# Patient Record
Sex: MALE | Race: WHITE | NOT HISPANIC OR LATINO | Employment: FULL TIME | URBAN - METROPOLITAN AREA
[De-identification: names, ages, dates, MRNs, and addresses within clinical notes are randomized per-mention and may not be internally consistent; named-entity substitution may affect disease eponyms.]

---

## 2017-03-03 ENCOUNTER — TELEPHONE (OUTPATIENT)
Dept: NEUROLOGY | Facility: CLINIC | Age: 42
End: 2017-03-03

## 2017-03-03 NOTE — TELEPHONE ENCOUNTER
PA Initiation    Medication: Aubagio 14mg  Insurance Company: [employer] - Phone [phone] Fax [phone]  Pharmacy Filling the Rx: [facility] - [place], [place] - [address]  Filling Pharmacy Phone:    Filling Pharmacy Fax:    Start Date: 3/3/2017

## 2017-03-03 NOTE — TELEPHONE ENCOUNTER
Prior Authorization Specialty Medication Request    Medication/Dose: Aubagio 14mg  Diagnosis and ICD: Relapsing Remitting Multiple Sclerosis, G35  New/Renewal/Insurance Change PA: Renewal    Important Lab Values: n/a    Previously Tried and Failed Therapies: Extavia and Betaseron    Rationale: Continuation of current disease modifying therapy for demyelinating disease. Patient remains clinically stable on, please approve.    Would you like to include any research articles? n/a   If yes please include the hyperlink(s) below or fax @ [phone].    (Include Name and MRN)    If you received a fax notification from an outside Pharmacy;  Pharmacy Name:[facility]  Pharmacy #:n/a  Pharmacy Fax:n/a

## 2017-03-06 NOTE — TELEPHONE ENCOUNTER
Prior Authorization Approval    Authorization Effective Date: 3/3/2017  Authorization Expiration Date: 3/3/2019  Medication: Aubagio 14mg APPROVED  Approved Dose/Quantity: 28 per 28 days  Reference #: [id number] ([email or web address])  Insurance Company: [employer] - Phone [phone] Fax [phone]  Expected CoPay: N/A     CoPay Card Available:      Foundation Assistance Needed:    Which Pharmacy is filling the prescription (Not needed for infusion/clinic administered): [facility] - [place], [place] - [address] [place] [address]  Pharmacy Notified: No  Patient Notified: Yes

## 2017-05-09 ENCOUNTER — OFFICE VISIT (OUTPATIENT)
Dept: NEUROLOGY | Facility: CLINIC | Age: 42
End: 2017-05-09
Attending: PSYCHIATRY & NEUROLOGY
Payer: COMMERCIAL

## 2017-05-09 VITALS — HEIGHT: 69 IN | SYSTOLIC BLOOD PRESSURE: 136 MMHG | HEART RATE: 77 BPM | DIASTOLIC BLOOD PRESSURE: 80 MMHG

## 2017-05-09 DIAGNOSIS — G35 MULTIPLE SCLEROSIS (H): Primary | ICD-10-CM

## 2017-05-09 LAB
ALBUMIN SERPL-MCNC: 3.8 G/DL (ref 3.4–5)
ALP SERPL-CCNC: 61 U/L (ref 40–150)
ALT SERPL W P-5'-P-CCNC: 25 U/L (ref 0–70)
AST SERPL W P-5'-P-CCNC: 25 U/L (ref 0–45)
BASOPHILS # BLD AUTO: 0.1 10E9/L (ref 0–0.2)
BASOPHILS NFR BLD AUTO: 1.5 %
BILIRUB DIRECT SERPL-MCNC: 0.2 MG/DL (ref 0–0.2)
BILIRUB SERPL-MCNC: 0.6 MG/DL (ref 0.2–1.3)
DIFFERENTIAL METHOD BLD: ABNORMAL
EOSINOPHIL # BLD AUTO: 0.3 10E9/L (ref 0–0.7)
EOSINOPHIL NFR BLD AUTO: 9.9 %
ERYTHROCYTE [DISTWIDTH] IN BLOOD BY AUTOMATED COUNT: 13.1 % (ref 10–15)
HCT VFR BLD AUTO: 44.5 % (ref 40–53)
HGB BLD-MCNC: 14.8 G/DL (ref 13.3–17.7)
IMM GRANULOCYTES # BLD: 0 10E9/L (ref 0–0.4)
IMM GRANULOCYTES NFR BLD: 0 %
LYMPHOCYTES # BLD AUTO: 1.3 10E9/L (ref 0.8–5.3)
LYMPHOCYTES NFR BLD AUTO: 38 %
MCH RBC QN AUTO: 28.7 PG (ref 26.5–33)
MCHC RBC AUTO-ENTMCNC: 33.3 G/DL (ref 31.5–36.5)
MCV RBC AUTO: 86 FL (ref 78–100)
MONOCYTES # BLD AUTO: 0.4 10E9/L (ref 0–1.3)
MONOCYTES NFR BLD AUTO: 12 %
NEUTROPHILS # BLD AUTO: 1.3 10E9/L (ref 1.6–8.3)
NEUTROPHILS NFR BLD AUTO: 38.6 %
NRBC # BLD AUTO: 0 10*3/UL
NRBC BLD AUTO-RTO: 0 /100
PLATELET # BLD AUTO: 148 10E9/L (ref 150–450)
PROT SERPL-MCNC: 7 G/DL (ref 6.8–8.8)
RBC # BLD AUTO: 5.16 10E12/L (ref 4.4–5.9)
WBC # BLD AUTO: 3.3 10E9/L (ref 4–11)

## 2017-05-09 PROCEDURE — 85025 COMPLETE CBC W/AUTO DIFF WBC: CPT | Performed by: PSYCHIATRY & NEUROLOGY

## 2017-05-09 PROCEDURE — 40000809 ZZH STATISTIC NO DOCUMENTATION TO SUPPORT CHARGE

## 2017-05-09 PROCEDURE — 80076 HEPATIC FUNCTION PANEL: CPT | Performed by: PSYCHIATRY & NEUROLOGY

## 2017-05-09 PROCEDURE — 36415 COLL VENOUS BLD VENIPUNCTURE: CPT | Performed by: PSYCHIATRY & NEUROLOGY

## 2017-05-09 ASSESSMENT — PAIN SCALES - GENERAL: PAINLEVEL: NO PAIN (0)

## 2017-05-09 NOTE — MR AVS SNAPSHOT
After Visit Summary   5/9/2017    Jamal Perez    MRN: [id number]           Patient Information     Date Of Birth          1975        Visit Information        Provider Department      5/9/2017 8:00 AM Francisco Brian,  [facility] Multiple Sclerosis        Today's Diagnoses     Multiple sclerosis (H)    -  1       Follow-ups after your visit        Follow-up notes from your care team     Return in about 6 months (around 11/9/2017).      Your next 10 appointments already scheduled     May 09, 2017  8:45 AM CDT   LAB with  LAB   [facility] ([facility])    [address] [place] [address] [phone]           Patient must bring picture ID.  Patient should be prepared to give a urine specimen  Please do not eat 10-12 hours before your appointment if you are coming in fasting for labs on lipids, cholesterol, or glucose (sugar).  Pregnant women should follow their Care Team instructions. Water with medications is okay. Do not drink coffee or other fluids.   If you have concerns about taking  your medications, please ask at office or if scheduling via [employer], send a message by clicking on Secure Messaging, Message Your Care Team.            Nov 06, 2017  8:00 AM CST   (Arrive by 7:45 AM)   Return Multiple Sclerosis with GERI Fletcher CNP   [facility] Multiple Sclerosis ([facility])    [address] [place] [address] [phone]              Who to contact     If you have questions or need follow up information about today's clinic visit or your schedule please contact [facility] MULTIPLE SCLEROSIS directly at [phone].  Normal or non-critical lab and imaging results will be communicated to you by MyChart, letter or phone within 4 business days after the clinic has received the results. If you do not hear from us within 7 days, please contact the clinic through [employer] or  "phone. If you have a critical or abnormal lab result, we will notify you by phone as soon as possible.  Submit refill requests through [employer] or call your pharmacy and they will forward the refill request to us. Please allow 3 business days for your refill to be completed.          Additional Information About Your Visit        Media Chaperonehart Information     [employer] gives you secure access to your electronic health record. If you see a primary care provider, you can also send messages to your care team and make appointments. If you have questions, please call your primary care clinic.  If you do not have a primary care provider, please call [phone] and they will assist you.        Care EveryWhere ID     This is your Care EveryWhere ID. This could be used by other organizations to access your [place] medical records  [id number]        Your Vitals Were     Pulse Height                77 1.753 m (5' 9\")           Blood Pressure from Last 3 Encounters:   05/09/17 136/80   11/15/16 136/83   05/25/16 124/77    Weight from Last 3 Encounters:   11/15/16 68 kg (150 lb)   05/25/16 68 kg (150 lb)   07/27/15 64.4 kg (142 lb)              We Performed the Following     CBC with platelets differential     Hepatic panel        Primary Care Provider Office Phone # Fax #    [facility] [phone]       [facility] [address]  [place] [address]        Thank you!     Thank you for choosing [facility] MULTIPLE SCLEROSIS  for your care. Our goal is always to provide you with excellent care. Hearing back from our patients is one way we can continue to improve our services. Please take a few minutes to complete the written survey that you may receive in the mail after your visit with us. Thank you!             Your Updated Medication List - Protect others around you: Learn how to safely use, store and throw away your medicines at www.disposemymeds.org.          This list is accurate as of: " 5/9/17  8:25 AM.  Always use your most recent med list.                   Brand Name Dispense Instructions for use    CLARITIN 10 MG tablet   Generic drug:  loratadine      Take 1 tablet by mouth. Every morning as needed       ibuprofen 200 MG tablet    ADVIL/MOTRIN     Take 1 tablet by mouth. Every 6-8 hours as needed       Multi-vitamin Tabs tablet   Generic drug:  multivitamin, therapeutic with minerals      Take 1 tablet by mouth daily.       teriflunomide 14 MG tablet    AUBAGIO    30 tablet    Take 1 tablet (14 mg) by mouth daily       TYLENOL 500 MG tablet   Generic drug:  acetaminophen      Take 1 tablet by mouth. every 4-6 hours as needed       vitamin D 1000 UNITS capsule      Take 1 capsule by mouth daily.

## 2017-05-09 NOTE — LETTER
5/9/2017     RE: Jamal Perez  [address]  [place] [facility] [address]     Dear Colleague,    Thank you for referring your patient, Jamal Perez, to the [facility] MULTIPLE SCLEROSIS at [facility]. Please see a copy of my visit note below.    SUBJECTIVE:  Followup on Jamal Perez, whom I see for relapsing-remitting multiple sclerosis.  He is tolerating the Aubagio well, not missing any doses.  He has been on it now for about 2 years.  He had a followup MRI done on 05/05, which I reviewed and is stable.  There are no new lesions, no enhancing activity, no change from the previous study done a year earlier.  He has been on the Aubagio now for 2 years, and he has remained stable.  He still gets intermittent tingling of his hands if he gets stressed or overheated.  He has no other issues or concerns.  He denies any bowel or bladder issues.  He denies any issues with fatigue.      MEDICATIONS:  Reviewed and up-to-date in Epic.      REVIEW OF SYSTEMS:  As per History of Present Illness.      PHYSICAL EXAMINATION:   VITAL SIGNS:  Blood pressure 136/80, pulse 77.   GENERAL:  Pleasant, well-developed, well-nourished male in no apparent distress.   CRANIAL NERVES:  II-XII are intact.   MOTOR:  Strength is 5/5 in all extremities.  Normal bulk and tone.  No pronator drift.   GAIT:  Unremarkable.      IMPRESSION:  Relapsing-remitting multiple sclerosis, responding favorably to Aubagio.      PLAN:     1.  CBC and hepatic profile.     2.  Followup with [facility] in 6 months.         CORTNEY WASHINGTON,

## 2017-05-10 NOTE — PROGRESS NOTES
SUBJECTIVE:  Followup on Jamal Perez, whom I see for relapsing-remitting multiple sclerosis.  He is tolerating the Aubagio well, not missing any doses.  He has been on it now for about 2 years.  He had a followup MRI done on , which I reviewed and is stable.  There are no new lesions, no enhancing activity, no change from the previous study done a year earlier.  He has been on the Aubagio now for 2 years, and he has remained stable.  He still gets intermittent tingling of his hands if he gets stressed or overheated.  He has no other issues or concerns.  He denies any bowel or bladder issues.  He denies any issues with fatigue.      MEDICATIONS:  Reviewed and up-to-date in Epic.      REVIEW OF SYSTEMS:  As per History of Present Illness.      PHYSICAL EXAMINATION:   VITAL SIGNS:  Blood pressure 136/80, pulse 77.   GENERAL:  Pleasant, well-developed, well-nourished male in no apparent distress.   CRANIAL NERVES:  II-XII are intact.   MOTOR:  Strength is 5/5 in all extremities.  Normal bulk and tone.  No pronator drift.   GAIT:  Unremarkable.      IMPRESSION:  Relapsing-remitting multiple sclerosis, responding favorably to Aubagio.      PLAN:     1.  CBC and hepatic profile.     2.  Followup with [place] in 6 months.         CORTNEY WASHINGTON DO             D: 2017 08:24   T: 05/10/2017 05:06   MT: farrah      Name:     JAMAL PEREZ   MRN:      [id number]        Account:      [id number]   :      1975           Service Date: 2017      Document: [id number]

## 2017-06-28 DIAGNOSIS — G35 MULTIPLE SCLEROSIS (H): ICD-10-CM

## 2017-06-28 RX ORDER — TERIFLUNOMIDE 14 MG/1
14 TABLET, FILM COATED ORAL DAILY
Qty: 30 TABLET | Refills: 11 | Status: SHIPPED | OUTPATIENT
Start: 2017-06-28 | End: 2018-01-18

## 2017-06-28 NOTE — TELEPHONE ENCOUNTER
Received refill request for Aubagio from [facility]; Patient was last seen in May by Dr Brian and has follow up appointment in November with Kim Bunch; Refilled for 1 year per [place] refill protocol.    Allyson Stevens, MS RN Care Coordinator

## 2017-11-06 ENCOUNTER — OFFICE VISIT (OUTPATIENT)
Dept: NEUROLOGY | Facility: CLINIC | Age: 42
End: 2017-11-06
Attending: NURSE PRACTITIONER
Payer: COMMERCIAL

## 2017-11-06 VITALS
SYSTOLIC BLOOD PRESSURE: 130 MMHG | DIASTOLIC BLOOD PRESSURE: 80 MMHG | HEIGHT: 69 IN | HEART RATE: 84 BPM | BODY MASS INDEX: 22.36 KG/M2 | WEIGHT: 151 LBS

## 2017-11-06 DIAGNOSIS — G35 MULTIPLE SCLEROSIS (H): ICD-10-CM

## 2017-11-06 DIAGNOSIS — E55.9 VITAMIN D DEFICIENCY: Primary | ICD-10-CM

## 2017-11-06 DIAGNOSIS — E55.9 VITAMIN D DEFICIENCY: ICD-10-CM

## 2017-11-06 LAB
ALBUMIN SERPL-MCNC: 4.3 G/DL (ref 3.4–5)
ALP SERPL-CCNC: 68 U/L (ref 40–150)
ALT SERPL W P-5'-P-CCNC: 32 U/L (ref 0–70)
AST SERPL W P-5'-P-CCNC: 27 U/L (ref 0–45)
BASOPHILS # BLD AUTO: 0.1 10E9/L (ref 0–0.2)
BASOPHILS NFR BLD AUTO: 0.9 %
BILIRUB DIRECT SERPL-MCNC: 0.2 MG/DL (ref 0–0.2)
BILIRUB SERPL-MCNC: 0.9 MG/DL (ref 0.2–1.3)
DEPRECATED CALCIDIOL+CALCIFEROL SERPL-MC: 40 UG/L (ref 20–75)
DIFFERENTIAL METHOD BLD: NORMAL
EOSINOPHIL # BLD AUTO: 0.3 10E9/L (ref 0–0.7)
EOSINOPHIL NFR BLD AUTO: 3.9 %
ERYTHROCYTE [DISTWIDTH] IN BLOOD BY AUTOMATED COUNT: 13.3 % (ref 10–15)
HCT VFR BLD AUTO: 46.6 % (ref 40–53)
HGB BLD-MCNC: 15.3 G/DL (ref 13.3–17.7)
IMM GRANULOCYTES # BLD: 0 10E9/L (ref 0–0.4)
IMM GRANULOCYTES NFR BLD: 0.3 %
LYMPHOCYTES # BLD AUTO: 1.3 10E9/L (ref 0.8–5.3)
LYMPHOCYTES NFR BLD AUTO: 19.4 %
MCH RBC QN AUTO: 28.5 PG (ref 26.5–33)
MCHC RBC AUTO-ENTMCNC: 32.8 G/DL (ref 31.5–36.5)
MCV RBC AUTO: 87 FL (ref 78–100)
MONOCYTES # BLD AUTO: 0.7 10E9/L (ref 0–1.3)
MONOCYTES NFR BLD AUTO: 11 %
NEUTROPHILS # BLD AUTO: 4.2 10E9/L (ref 1.6–8.3)
NEUTROPHILS NFR BLD AUTO: 64.5 %
NRBC # BLD AUTO: 0 10*3/UL
NRBC BLD AUTO-RTO: 0 /100
PLATELET # BLD AUTO: 166 10E9/L (ref 150–450)
PROT SERPL-MCNC: 7.6 G/DL (ref 6.8–8.8)
RBC # BLD AUTO: 5.37 10E12/L (ref 4.4–5.9)
WBC # BLD AUTO: 6.4 10E9/L (ref 4–11)

## 2017-11-06 PROCEDURE — 36415 COLL VENOUS BLD VENIPUNCTURE: CPT | Performed by: NURSE PRACTITIONER

## 2017-11-06 PROCEDURE — 85025 COMPLETE CBC W/AUTO DIFF WBC: CPT | Performed by: NURSE PRACTITIONER

## 2017-11-06 PROCEDURE — 82306 VITAMIN D 25 HYDROXY: CPT | Performed by: NURSE PRACTITIONER

## 2017-11-06 PROCEDURE — 99212 OFFICE O/P EST SF 10 MIN: CPT | Mod: ZF

## 2017-11-06 PROCEDURE — 80076 HEPATIC FUNCTION PANEL: CPT | Performed by: NURSE PRACTITIONER

## 2017-11-06 ASSESSMENT — PAIN SCALES - GENERAL: PAINLEVEL: NO PAIN (0)

## 2017-11-06 NOTE — MR AVS SNAPSHOT
After Visit Summary   11/6/2017    Jamal Perez    MRN: [id number]           Patient Information     Date Of Birth          1975        Visit Information        Provider Department      11/6/2017 8:00 AM Kim Bunch APRN CNP [facility] Multiple Sclerosis        Today's Diagnoses     Vitamin D deficiency    -  1    Multiple sclerosis (H)          Care Instructions    1. Labs today.    2. Please contact us with questions or concerns.           Follow-ups after your visit        Future tests that were ordered for you today     Open Future Orders        Priority Expected Expires Ordered    Vitamin D Deficiency Screening Routine  11/6/2018 11/6/2017    CBC with platelets differential Routine  11/6/2018 11/6/2017    Hepatic panel Routine  11/6/2018 11/6/2017            Who to contact     If you have questions or need follow up information about today's clinic visit or your schedule please contact [facility] MULTIPLE SCLEROSIS directly at [phone].  Normal or non-critical lab and imaging results will be communicated to you by 8aweekhart, letter or phone within 4 business days after the clinic has received the results. If you do not hear from us within 7 days, please contact the clinic through 8a[employer]hart or phone. If you have a critical or abnormal lab result, we will notify you by phone as soon as possible.  Submit refill requests through [employer] or call your pharmacy and they will forward the refill request to us. Please allow 3 business days for your refill to be completed.          Additional Information About Your Visit        MyChart Information     [employer] gives you secure access to your electronic health record. If you see a primary care provider, you can also send messages to your care team and make appointments. If you have questions, please call your primary care clinic.  If you do not have a primary care provider, please call [phone] and they will assist you.        Care EveryWhere  "ID     This is your Care EveryWhere ID. This could be used by other organizations to access your [place] medical records  [id number]        Your Vitals Were     Pulse Height BMI (Body Mass Index)             84 1.753 m (5' 9\") 22.3 kg/m2          Blood Pressure from Last 3 Encounters:   11/06/17 130/80   05/09/17 136/80   11/15/16 136/83    Weight from Last 3 Encounters:   11/06/17 68.5 kg (151 lb)   11/15/16 68 kg (150 lb)   05/25/16 68 kg (150 lb)               Primary Care Provider Office Phone # Fax #    [facility] [phone] [address] [address]        Equal Access to Services     SKYLAR ROSALES : Hadii estephanie headley hadasho Soomaali, waaxda luqadaha, qaybta kaalmada adeegyada, agustin ponce . So [facility] [phone].    ATENCIÓN: Si habla español, tiene a hogan disposición servicios gratuitos de asistencia lingüística. Llame al [phone].    We comply with applicable federal civil rights laws and [place] laws. We do not discriminate on the basis of race, color, national origin, age, disability, sex, sexual orientation, or gender identity.            Thank you!     Thank you for choosing [facility] MULTIPLE SCLEROSIS  for your care. Our goal is always to provide you with excellent care. Hearing back from our patients is one way we can continue to improve our services. Please take a few minutes to complete the written survey that you may receive in the mail after your visit with us. Thank you!             Your Updated Medication List - Protect others around you: Learn how to safely use, store and throw away your medicines at www.disposemymeds.org.          This list is accurate as of: 11/6/17  8:37 AM.  Always use your most recent med list.                   Brand Name Dispense Instructions for use Diagnosis    CLARITIN 10 MG tablet   Generic drug:  loratadine      Take 1 tablet by mouth. Every morning as needed        ibuprofen 200 MG tablet    " ADVIL/MOTRIN     Take 1 tablet by mouth. Every 6-8 hours as needed        Multi-vitamin Tabs tablet   Generic drug:  multivitamin, therapeutic with minerals      Take 1 tablet by mouth daily.        teriflunomide 14 MG tablet    AUBAGIO    30 tablet    Take 1 tablet (14 mg) by mouth daily    Multiple sclerosis (H)       TYLENOL 500 MG tablet   Generic drug:  acetaminophen      Take 1 tablet by mouth. every 4-6 hours as needed        vitamin D 1000 UNITS capsule      Take 1 capsule by mouth daily.

## 2017-11-06 NOTE — LETTER
11/6/2017       RE: Jamal Perez  [address]  [place] [address]     Dear Colleague,    Thank you for referring your patient, Jamal Perez, to the [facility] MULTIPLE SCLEROSIS at [facility]. Please see a copy of my visit note below.      [facility] OUTPATIENT MULTIPLE SCLEROSIS CLINIC VISIT NOTE    REASON FOR VISIT:  Jamal is a 42-year-old male who returns to the clinic today for regularly scheduled followup of his diagnosis of relapsing remitting multiple sclerosis.  He was most recently seen by Dr. Brian on 05/09/2017.       HISTORY OF PRESENT ILLNESS:     DATE OF ONSET:   1996   INITIAL SYMPTOMS:   Numbness in his left side which gradually spread to his right.   DATE OF DIAGNOSIS: In 1998 after follow-up MRI which showed new lesions.     INITIAL CLINICAL COURSE:   Relapsing remitting.   CURRENT CLINICAL COURSE:  Relapsing remitting.   PAST DISEASE MODIFYING THERAPY:  Betaseron (1998- 2015) switched to Aubagio in 2015 due to injection fatigue.   CURRENT DISEASE MODIFYING THERAPY:   Aubagio.      INTERVAL HISTORY SINCE LAST VISIT:  Overall, Jamal is doing well.  No recent hospitalization or illness.  He denies any new changes in his vision, balance, strength or sensation suggestive of a new relapse of multiple sclerosis since he was last seen here.  He is currently on Aubagio, tolerating the medication well.  No major side effects from the medication.  During his last visit he had lab work done.  His hepatic panel was stable.  Noted a low platelet count of 148 and a low absolute neutrophil count of 1.3.  His upper and lower extremities are working well for him. Denies n/t or weakness.  Reports his mood as stable.  Reports mild fatigue issues, but he is able to manage it with enough sleep.  He sleeps at least 7 hours at night.  He tells me that staying asleep is a problem if he wakes up after 4-5 hours of sleep.  Denies any issues with the spasticity,  "bladder or bowel.  His most recent MRI was on 05/05/2017 and it is reported as stable without any new lesions or active lesions.     Patient tells me that he is planning to move to [place], [place] with his family since his wife got a new job there and he is slightly stressed about this moving. Reports that they do not have any family down there.      PAST MEDICAL/SURGICAL HISTORY:   Deviated nasal septum corrective surgery in 2011, adenoidectomy, migraine with visual aura.      FAMILY HISTORY:  Positive for MS in his maternal cousin.      SOCIAL HISTORY:  He is .  They have 2 children, ages 6 and 9.  He works as a .  He denies smoking and recreational drug use.  Reports 1 alcoholic drink per day.      VITAMIN D:   He is currently taking 1000 international unit vitamin D3 and also a multivitamin which has 400 international units of vitamin D.  His most recent level from 07/2015 was 44.     05/05/2017 MRI Brain:  The study demonstrates 10-15 foci of T2-hyperintensity  within the cerebral white matter consistent with the clinical  suspicion of demyelinating disease. There no abnormal enhancement  noted intracranially. There is no interval change from the prior  study.        PHYSICAL EXAMINATION:   VITAL SIGNS: /80  Pulse 84  Ht 1.753 m (5' 9\")  Wt 68.5 kg (151 lb)  BMI 22.3 kg/m2   GENERAL: The patient is a well-nourished male who presents to the evaluation alone.    NEUROLOGIC:   MENTAL STATUS: Alert,awake and oriented times four.   CRANIAL NERVES: Visual fields are full to confrontation. The pupils are equal, round and react to light and there is no Sage Ines pupil. Funduscopic examination demonstrates no optic pallor, papilledema or retinal hemorrhage/exudate. Extraocular movements are intact with no internuclear ophthalmoplegia. No nystagmus. Facial strength and sensation are  normal. Hearing is normal. Palate elevation and tongue protrusion are normal.   POWER: Strength " is normal (5/5) in the following muscles/groups: Deltoids, biceps, triceps, wrist extensors, finger abductors,  hip flexors, knee flexors, foot dorsiflexors.    SENSORY: Intact to light touch throughout.  REFLEXES: Reflexes are normal, present and symmetric at biceps, triceps, brachioradialis and ankles. Left knee with a slightly stronger reflex than right.   MOTOR/CEREBELLAR: There are no tremors, myoclonus or other abnormal movements. Tone is within normal limits in the limbs. There is no appendicular ataxia on finger-to-nose testing and rapid alternating movements are normal in the extremities. There is no pronator drift. Romberg negative.  GAIT: Gait is  narrow-based and steady and the patient is able to walk on heels, toes and in tandem without difficulty.        ASSESSMENT/PLAN:     1.  Relapsing remitting multiple sclerosis, currently stable on Aubagio: Overall, he is doing well both clinically and radiographically.  We will plan to check a CBC with differential and hepatic panel for Aubagio monitoring today.  Per patient, he did not want to schedule a follow-up since he is planning to move out of [place] in early spring.  He asked for recommendation for MS specialists/ neurologist in [place], [place] area.  I will check with my collaborating physicians and I will contact him when I hear from them.      2.  Vitamin D deficiency: He is currently taking 1000 international units vitamin D3 along with a multivitamin combination pill.  We will plan to check his vitamin D level with other labs today.  I will contact the patient if he needs a dose change.     3.  Please call us with questions or concerns.            GERI MENENDEZ, CNP             D: 2017 13:26   T: 2017 14:15   MT: TONYA      Name:     YING PRINCE   MRN:      [id number]        Account:      [id number]   :      1975           Service Date: 2017      Document: [id number]

## 2017-11-06 NOTE — PROGRESS NOTES
[facility] OUTPATIENT MULTIPLE SCLEROSIS CLINIC VISIT NOTE    REASON FOR VISIT:  Jamal is a 42-year-old male who returns to the clinic today for regularly scheduled followup of his diagnosis of relapsing remitting multiple sclerosis.  He was most recently seen by Dr. Brian on 05/09/2017.       HISTORY OF PRESENT ILLNESS:     DATE OF ONSET:   1996   INITIAL SYMPTOMS:   Numbness in his left side which gradually spread to his right.   DATE OF DIAGNOSIS: In 1998 after follow-up MRI which showed new lesions.     INITIAL CLINICAL COURSE:   Relapsing remitting.   CURRENT CLINICAL COURSE:  Relapsing remitting.   PAST DISEASE MODIFYING THERAPY:  Betaseron (1998- 2015) switched to Aubagio in 2015 due to injection fatigue.   CURRENT DISEASE MODIFYING THERAPY:   Aubagio.      INTERVAL HISTORY SINCE LAST VISIT:  Overall, Jamal is doing well.  No recent hospitalization or illness.  He denies any new changes in his vision, balance, strength or sensation suggestive of a new relapse of multiple sclerosis since he was last seen here.  He is currently on Aubagio, tolerating the medication well.  No major side effects from the medication.  During his last visit he had lab work done.  His hepatic panel was stable.  Noted a low platelet count of 148 and a low absolute neutrophil count of 1.3.  His upper and lower extremities are working well for him. Denies n/t or weakness.  Reports his mood as stable.  Reports mild fatigue issues, but he is able to manage it with enough sleep.  He sleeps at least 7 hours at night.  He tells me that staying asleep is a problem if he wakes up after 4-5 hours of sleep.  Denies any issues with the spasticity, bladder or bowel.  His most recent MRI was on 05/05/2017 and it is reported as stable without any new lesions or active lesions.     Patient tells me that he is planning to move to [place], [place] with his family since his wife got a new job there and he is slightly stressed about  "this moving. Reports that they do not have any family down there.      PAST MEDICAL/SURGICAL HISTORY:   Deviated nasal septum corrective surgery in 2011, adenoidectomy, migraine with visual aura.      FAMILY HISTORY:  Positive for MS in his maternal cousin.      SOCIAL HISTORY:  He is .  They have 2 children, ages 6 and 9.  He works as a .  He denies smoking and recreational drug use.  Reports 1 alcoholic drink per day.      VITAMIN D:   He is currently taking 1000 international unit vitamin D3 and also a multivitamin which has 400 international units of vitamin D.  His most recent level from 07/2015 was 44.     05/05/2017 MRI Brain:  The study demonstrates 10-15 foci of T2-hyperintensity  within the cerebral white matter consistent with the clinical  suspicion of demyelinating disease. There no abnormal enhancement  noted intracranially. There is no interval change from the prior  study.        PHYSICAL EXAMINATION:   VITAL SIGNS: /80  Pulse 84  Ht 1.753 m (5' 9\")  Wt 68.5 kg (151 lb)  BMI 22.3 kg/m2   GENERAL: The patient is a well-nourished male who presents to the evaluation alone.    NEUROLOGIC:   MENTAL STATUS: Alert,awake and oriented times four.   CRANIAL NERVES: Visual fields are full to confrontation. The pupils are equal, round and react to light and there is no Sage Ines pupil. Funduscopic examination demonstrates no optic pallor, papilledema or retinal hemorrhage/exudate. Extraocular movements are intact with no internuclear ophthalmoplegia. No nystagmus. Facial strength and sensation are  normal. Hearing is normal. Palate elevation and tongue protrusion are normal.   POWER: Strength is normal (5/5) in the following muscles/groups: Deltoids, biceps, triceps, wrist extensors, finger abductors,  hip flexors, knee flexors, foot dorsiflexors.    SENSORY: Intact to light touch throughout.  REFLEXES: Reflexes are normal, present and symmetric at biceps, triceps, " brachioradialis and ankles. Left knee with a slightly stronger reflex than right.   MOTOR/CEREBELLAR: There are no tremors, myoclonus or other abnormal movements. Tone is within normal limits in the limbs. There is no appendicular ataxia on finger-to-nose testing and rapid alternating movements are normal in the extremities. There is no pronator drift. Romberg negative.  GAIT: Gait is  narrow-based and steady and the patient is able to walk on heels, toes and in tandem without difficulty.        ASSESSMENT/PLAN:     1.  Relapsing remitting multiple sclerosis, currently stable on Aubagio: Overall, he is doing well both clinically and radiographically.  We will plan to check a CBC with differential and hepatic panel for Aubagio monitoring today.  Per patient, he did not want to schedule a follow-up since he is planning to move out of [place] in early spring.  He asked for recommendation for MS specialists/ neurologist in [facility].  I will check with my collaborating physicians and I will contact him when I hear from them.      2.  Vitamin D deficiency: He is currently taking 1000 international units vitamin D3 along with a multivitamin combination pill.  We will plan to check his vitamin D level with other labs today.  I will contact the patient if he needs a dose change.     3.  Please call us with questions or concerns.            GERI MENENDEZ CNP             D: 2017 13:26   T: 2017 14:15   MT: TONYA      Name:     YING PRINCE   MRN:      [id number]        Account:      [id number]   :      1975           Service Date: 2017      Document: [id number]

## 2017-11-06 NOTE — NURSING NOTE
"Chief Complaint   Patient presents with     RECHECK     ISH     Chief Complaint   Patient presents with     RECHECK     ISH       Initial /80  Pulse 84  Ht 1.753 m (5' 9\")  Wt 68.5 kg (151 lb)  BMI 22.3 kg/m2 Estimated body mass index is 22.3 kg/(m^2) as calculated from the following:    Height as of this encounter: 1.753 m (5' 9\").    Weight as of this encounter: 68.5 kg (151 lb).  Medication Reconciliation: complete   Omaira JOLLY MA    "

## 2017-11-07 ENCOUNTER — MYC MEDICAL ADVICE (OUTPATIENT)
Dept: NEUROLOGY | Facility: CLINIC | Age: 42
End: 2017-11-07

## 2018-03-13 ENCOUNTER — TELEPHONE (OUTPATIENT)
Dept: NEUROLOGY | Facility: CLINIC | Age: 43
End: 2018-03-13

## 2018-03-13 NOTE — TELEPHONE ENCOUNTER
Left [facility] for patient letting him know a [employer] message was sent in November with a recommendation. I'm unsure if he needs an actual referral so I asked for a call back if he does.

## 2018-03-13 NOTE — TELEPHONE ENCOUNTER
I had already sent him a my chart message regarding this on 11/7/2017. Neurology referral is fine.

## 2018-03-13 NOTE — TELEPHONE ENCOUNTER
Per call center: Pt called and would like a call back to discuss getting a referral to see a Neurologist closer to them as they have moved out the state to [place].     Best call back: [phone]     Kim, do you have any recommendations before I call the patient back?

## 2018-04-05 ENCOUNTER — TELEPHONE (OUTPATIENT)
Dept: NEUROLOGY | Facility: CLINIC | Age: 43
End: 2018-04-05

## 2018-04-05 NOTE — TELEPHONE ENCOUNTER
Prior Authorization Specialty Medication Request    Medication/Dose: Aubagio 14mg  ICD code (if different than what is on RX):  Relapsing Remitting Multiple Sclerosis, G35  Previously Tried and Failed:  Extavia and Betaseron     Important Lab Values: n/a  Rationale: Continuation of current disease modifying therapy for demyelinating disease, currently clinically and radiologically stable on, please approve.    Insurance Name: n/a - Patient has insurance change  Insurance ID: [id number]  Insurance Phone Number: n/a    Pharmacy Information (if different than what is on RX)  Name:  [facility]  Phone:  n/a

## 2018-04-05 NOTE — TELEPHONE ENCOUNTER
Called [employer] at [phone] and representative tansferred me to patient's insurnace PA department. Answered questions over the phone and was able to get an approval. Ref# is [id number] with approval dates of 04/05/2018-04/20/2023. And we should be receiveing an approval letter within the hour, will document once received.     Prior Authorization Approval    Authorization Effective Date: 4/5/2018  Authorization Expiration Date: 4/05/2023  Medication: Aubagio 14mg APPROVED  Approved Dose/Quantity: 30/30 days  Reference #: [id number]    Insurance Company: [employer] ([facility]) - Phone [phone] Fax [phone]  Which Pharmacy is filling the prescription (Not needed for infusion/clinic administered): [facility] - [id number]

## 2019-11-05 ENCOUNTER — HEALTH MAINTENANCE LETTER (OUTPATIENT)
Age: 44
End: 2019-11-05

## 2019-11-21 ENCOUNTER — TRANSFERRED RECORDS (OUTPATIENT)
Dept: HEALTH INFORMATION MANAGEMENT | Facility: CLINIC | Age: 44
End: 2019-11-21

## 2020-11-22 ENCOUNTER — HEALTH MAINTENANCE LETTER (OUTPATIENT)
Age: 45
End: 2020-11-22

## 2021-03-22 ENCOUNTER — IMMUNIZATION (OUTPATIENT)
Dept: NURSING | Facility: CLINIC | Age: 46
End: 2021-03-22
Payer: COMMERCIAL

## 2021-03-22 PROCEDURE — 91300 PR COVID VAC PFIZER DIL RECON 30 MCG/0.3 ML IM: CPT

## 2021-03-22 PROCEDURE — 0001A PR COVID VAC PFIZER DIL RECON 30 MCG/0.3 ML IM: CPT

## 2021-04-12 ENCOUNTER — IMMUNIZATION (OUTPATIENT)
Dept: NURSING | Facility: CLINIC | Age: 46
End: 2021-04-12
Attending: INTERNAL MEDICINE
Payer: COMMERCIAL

## 2021-04-12 PROCEDURE — 0002A PR COVID VAC PFIZER DIL RECON 30 MCG/0.3 ML IM: CPT

## 2021-04-12 PROCEDURE — 91300 PR COVID VAC PFIZER DIL RECON 30 MCG/0.3 ML IM: CPT

## 2021-09-19 ENCOUNTER — HEALTH MAINTENANCE LETTER (OUTPATIENT)
Age: 46
End: 2021-09-19

## 2021-09-23 ENCOUNTER — TRANSFERRED RECORDS (OUTPATIENT)
Dept: HEALTH INFORMATION MANAGEMENT | Facility: CLINIC | Age: 46
End: 2021-09-23

## 2022-01-09 ENCOUNTER — HEALTH MAINTENANCE LETTER (OUTPATIENT)
Age: 47
End: 2022-01-09

## 2022-09-13 ENCOUNTER — TRANSFERRED RECORDS (OUTPATIENT)
Dept: HEALTH INFORMATION MANAGEMENT | Facility: CLINIC | Age: 47
End: 2022-09-13

## 2022-10-23 ENCOUNTER — TRANSFERRED RECORDS (OUTPATIENT)
Dept: HEALTH INFORMATION MANAGEMENT | Facility: CLINIC | Age: 47
End: 2022-10-23

## 2022-11-20 ENCOUNTER — HEALTH MAINTENANCE LETTER (OUTPATIENT)
Age: 47
End: 2022-11-20

## 2023-07-18 ENCOUNTER — MEDICAL CORRESPONDENCE (OUTPATIENT)
Dept: HEALTH INFORMATION MANAGEMENT | Facility: CLINIC | Age: 48
End: 2023-07-18
Payer: COMMERCIAL

## 2023-07-18 ENCOUNTER — TRANSFERRED RECORDS (OUTPATIENT)
Dept: HEALTH INFORMATION MANAGEMENT | Facility: CLINIC | Age: 48
End: 2023-07-18

## 2023-07-19 ENCOUNTER — TRANSCRIBE ORDERS (OUTPATIENT)
Dept: OTHER | Age: 48
End: 2023-07-19

## 2023-07-19 DIAGNOSIS — G35 MULTIPLE SCLEROSIS (H): Primary | ICD-10-CM

## 2023-08-08 NOTE — TELEPHONE ENCOUNTER
Action 8/8/23 MV 9.57am   Action Taken 1) imaging request faxed to [facility]  2) records request faxed to [facility]     Action 8/24/23 MV 10.11am   Action Taken [facility] records received and sent to scanning ; [facility] images resolved in PACS but still need reports. Request faxed for reports.      Action 8/29/23 MV 9.13am   Action Taken Records received from [facility] and sent to scanning       RECORDS RECEIVED FROM: internal   REASON FOR VISIT: MS   Date of Appt: 9/12/23   NOTES (FOR ALL VISITS) STATUS DETAILS   OFFICE NOTE from referring provider Received Dr Pro Muñoz @ [facility]:  7/18/23  10/18/22  10/13/22  12/3/21  (Additional encounters)   OFFICE NOTE from other specialist Received Maine Sow @ [facility]:  9/13/22    Opal Ram @ [facility]:  9/1/21 8/28/20   MEDICATION LIST Received    IMAGING  (FOR ALL VISITS)     MRI (HEAD, NECK, SPINE) PACS [facility]:  MRI Brain 9/23/21    [facility]:  MRI Brain 11/21/19    [facility]:  MRI Brain 5/5/17  MRI Brain 7/7/16  MRI 6/10/15  (Additional images)

## 2023-09-12 ENCOUNTER — LAB (OUTPATIENT)
Dept: LAB | Facility: CLINIC | Age: 48
End: 2023-09-12
Payer: COMMERCIAL

## 2023-09-12 ENCOUNTER — OFFICE VISIT (OUTPATIENT)
Dept: NEUROLOGY | Facility: CLINIC | Age: 48
End: 2023-09-12
Attending: PSYCHIATRY & NEUROLOGY
Payer: COMMERCIAL

## 2023-09-12 ENCOUNTER — PRE VISIT (OUTPATIENT)
Dept: NEUROLOGY | Facility: CLINIC | Age: 48
End: 2023-09-12
Payer: COMMERCIAL

## 2023-09-12 VITALS
OXYGEN SATURATION: 97 % | HEIGHT: 70 IN | BODY MASS INDEX: 21.11 KG/M2 | SYSTOLIC BLOOD PRESSURE: 128 MMHG | DIASTOLIC BLOOD PRESSURE: 80 MMHG | HEART RATE: 80 BPM | WEIGHT: 147.5 LBS

## 2023-09-12 DIAGNOSIS — G35 MULTIPLE SCLEROSIS (H): ICD-10-CM

## 2023-09-12 DIAGNOSIS — E55.9 VITAMIN D DEFICIENCY: Primary | ICD-10-CM

## 2023-09-12 DIAGNOSIS — E55.9 VITAMIN D DEFICIENCY: ICD-10-CM

## 2023-09-12 LAB
ALT SERPL W P-5'-P-CCNC: 19 U/L (ref 0–70)
AST SERPL W P-5'-P-CCNC: 25 U/L (ref 0–45)
BASOPHILS # BLD AUTO: 0.1 10E3/UL (ref 0–0.2)
BASOPHILS NFR BLD AUTO: 2 %
DEPRECATED CALCIDIOL+CALCIFEROL SERPL-MC: 59 UG/L (ref 20–75)
EOSINOPHIL # BLD AUTO: 0.3 10E3/UL (ref 0–0.7)
EOSINOPHIL NFR BLD AUTO: 6 %
ERYTHROCYTE [DISTWIDTH] IN BLOOD BY AUTOMATED COUNT: 13.1 % (ref 10–15)
HCT VFR BLD AUTO: 43.4 % (ref 40–53)
HGB BLD-MCNC: 15.1 G/DL (ref 13.3–17.7)
IMM GRANULOCYTES # BLD: 0 10E3/UL
IMM GRANULOCYTES NFR BLD: 0 %
LYMPHOCYTES # BLD AUTO: 1.5 10E3/UL (ref 0.8–5.3)
LYMPHOCYTES NFR BLD AUTO: 34 %
MCH RBC QN AUTO: 29.6 PG (ref 26.5–33)
MCHC RBC AUTO-ENTMCNC: 34.8 G/DL (ref 31.5–36.5)
MCV RBC AUTO: 85 FL (ref 78–100)
MONOCYTES # BLD AUTO: 0.5 10E3/UL (ref 0–1.3)
MONOCYTES NFR BLD AUTO: 11 %
NEUTROPHILS # BLD AUTO: 2.1 10E3/UL (ref 1.6–8.3)
NEUTROPHILS NFR BLD AUTO: 47 %
NRBC # BLD AUTO: 0 10E3/UL
NRBC BLD AUTO-RTO: 0 /100
PLATELET # BLD AUTO: 172 10E3/UL (ref 150–450)
RBC # BLD AUTO: 5.1 10E6/UL (ref 4.4–5.9)
WBC # BLD AUTO: 4.5 10E3/UL (ref 4–11)

## 2023-09-12 PROCEDURE — 99000 SPECIMEN HANDLING OFFICE-LAB: CPT | Performed by: PATHOLOGY

## 2023-09-12 PROCEDURE — 85025 COMPLETE CBC W/AUTO DIFF WBC: CPT | Performed by: PATHOLOGY

## 2023-09-12 PROCEDURE — 99214 OFFICE O/P EST MOD 30 MIN: CPT | Performed by: PSYCHIATRY & NEUROLOGY

## 2023-09-12 PROCEDURE — 84460 ALANINE AMINO (ALT) (SGPT): CPT | Performed by: PATHOLOGY

## 2023-09-12 PROCEDURE — 84450 TRANSFERASE (AST) (SGOT): CPT | Performed by: PATHOLOGY

## 2023-09-12 PROCEDURE — 36415 COLL VENOUS BLD VENIPUNCTURE: CPT | Performed by: PATHOLOGY

## 2023-09-12 PROCEDURE — 99204 OFFICE O/P NEW MOD 45 MIN: CPT | Mod: GC | Performed by: PSYCHIATRY & NEUROLOGY

## 2023-09-12 PROCEDURE — 82306 VITAMIN D 25 HYDROXY: CPT | Performed by: PSYCHIATRY & NEUROLOGY

## 2023-09-12 ASSESSMENT — PAIN SCALES - GENERAL: PAINLEVEL: NO PAIN (0)

## 2023-09-12 NOTE — PROGRESS NOTES
Neurology Clinic Visit    Reason: Multiple Sclerosis     09/12/2023   Source of information: Patient and chart review    History of Present Symptom:  Jamal Perez is a 47 year old male with a PMH significant for multiple sclerosis who presents today to establish care for multiple sclerosis.    Patient previously received care here at the [facility] before moving out of [facility] briefly, and then when he returned back to [place] he received MS care at [facility]. Most recent visit at [facility] was about 1 year ago.      Patient reports that he was diagnosed with multiple sclerosis in 1998 though he believes his symptoms started in 1996.  In 1996 he had an episode of left sided sensory symptoms.  During that time he had MRI brain cervical spine and thoracic spine which showed lesions consistent with demyelination though was not given a diagnosis of multiple sclerosis.  In 1998 he had an additional attack of sensory symptoms, with repeat imaging showing interval lesions therefore diagnosis of multiple sclerosis was made.  At that time he was started on Betaseron which he tolerated well.  He said he thinks his most recent relapse was in 2012 which is also sensory symptoms in the lower extremities.  He continued on Betaseron until 2015 though stopped due to injection fatigue.  At that time you switch to Aubagio which she still currently taking.  He has not had any concerning symptoms recently for multiple sclerosis relapse.    He currently denies having any sensory symptoms, weakness, changes in vision, imbalance, fatigue, mood changes, bowel or bladder issues.  Does wonder if he has some short-term memory issues as well as word finding difficulties, though these are not limiting his personal or professional task.  Reports that he could walk as far as he wanted without being limited by MS symptoms.    He is currently taking vitamin D, unsure if it is 1000 or 2000 international units daily.    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    Disease onset: 1996  Most recent relapse: 2012  Previous disease modifying therapy:   Betaseron 1998 - 2015, injection fatigue  Aubagio 2015 - current    Symptom management:  Fatigue: none  Mood: stable  Bowel/bladder changes: none  Gait/balance: No issues    The patient's medical, surgical, social, and family history were personally reviewed with the patient.  No past medical history on file.   No past surgical history on file.  Social History     Tobacco Use    Smoking status: Former    Smokeless tobacco: Never   Substance Use Topics    Alcohol use: Yes     Comment: One drink per day     No family history on file.  Current Outpatient Medications   Medication    acetaminophen (TYLENOL) 500 MG tablet    Cholecalciferol (VITAMIN D) 1000 UNIT capsule    ibuprofen (ADVIL,MOTRIN) 200 MG tablet    loratadine (CLARITIN) 10 MG tablet    Multiple Vitamin (MULTI-VITAMIN) per tablet    teriflunomide (AUBAGIO) 14 MG tablet     No current facility-administered medications for this visit.     Allergies   Allergen Reactions    Nkda [No Known Drug Allergy]        Physical Examination   Vitals: /80 (BP Location: Right arm, Patient Position: Sitting, Cuff Size: Adult Regular)   Pulse 80   Ht 1.768 m (5' 9.61\")   Wt 66.9 kg (147 lb 8 oz)   SpO2 97%   BMI 21.40 kg/m     General: Patient appears comfortable in no acute distress.   HEENT: NC/AT, no icterus, moist mucous membranes  Chest: non-labored on RA  Extremities: Warm, no edema  Skin: No rash or lesion   Psych: Affect appropriate for situation   Neuro:  Mental status: Awake, alert, attentive. Language is fluent with intact comprehension.   Cranial nerves: PERRL with no relative afferent pupillary defect, conjugate gaze, EOMI, visual fields intact, face symmetric, shoulder shrug strong, tongue protrusion/uvula midline, no dysarthria.   Motor: Normal muscle bulk and tone. No abnormal " movements. 5/5 strength in 4/4 extremities.     R L  Deltoid  5 5  Biceps  5 5  Triceps  5 5  Wrist ext 5 5  Finger ext 5 5  Finger abd 5 5    Hip flexion 5 5  Knee flexion 5 5  Knee ext 5 5  Dorsiflexion 5 5    Reflexes: Brisk reflexes symmetric in biceps, brachioradialis, patellae, and achilles. No clonus, toes down-going.  Sensory: Intact to light touch, pin, vibration, and proprioception. Romberg is negative.   Coordination: FNF without ataxia or dysmetria.    Gait: Normal width, stride length, turn, with symmetric arm swing. Tandem walk intact.    Laboratory:  LFTs in 2022 wnl  WBC 2.9 (low)    Imaging:  MRI brain  9/2021- multiple non enhancing plaques    Assessment/Plan:  Jamal Perez is a 47 year old male who presents [facility] care for multiple [facility].  Patient was formally diagnosed in 1998.  More recently has been stable on Aubagio for about 8 years.  Has not had a relapse since 2012.  Today he currently declines having any symptoms related to MS.  Does report some mild short-term memory and word finding difficulties, though difficult to discern if this is from MS or normal aging.  We discussed that we can monitor this for now.  If things were to change in the future could consider formal neuropsychological testing.    - CBC, AST, ALT, vitamin D today  - Continue current dose of vitamin D, will adjust dose pending level  - MRI brain with and without contrast in 1 year  - Follow up in 1 year after MRI brain    Patient seen and discussed with Dr. Rothman.   I have reviewed the plan with the patient, who is in agreement.      Maine Pickering MD  Neurology Resident PGY4     I saw and examined this patient, and have read and edited the resident's note.  I agree with the findings, assessment, and plan.  I spent 47 minutes on his care on the date of service including chart review and face-to-face time.  Jamal has clinically definite relapsing MS that is well controlled on teriflunomide.  We discussed  the natural history of MS including how long he will likely need to be on treatment, as well as safety monitoring with teriflunomide.  Plan as above.    Fuentes Rothman MD

## 2023-09-12 NOTE — LETTER
9/12/2023       RE: Jamal Perez  [address]  [facility] [address]     Dear Colleague,    Thank you for referring your patient, Jamal Perez, to the [facility] MULTIPLE SCLEROSIS CLINIC [place] at [facility]. Please see a copy of my visit note below.    Neurology Clinic Visit    Reason: Multiple Sclerosis     09/12/2023   Source of information: Patient and chart review    History of Present Symptom:  Jamal Perez is a 47 year old male with a PMH significant for multiple sclerosis who presents today to establish care for multiple sclerosis.    Patient previously received care here at the [facility] before moving out of [facility] briefly, and then when he returned back to [place] he received MS care at [facility]. Most recent visit at [facility] was about 1 year ago.      Patient reports that he was diagnosed with multiple sclerosis in 1998 though he believes his symptoms started in 1996.  In 1996 he had an episode of left sided sensory symptoms.  During that time he had MRI brain cervical spine and thoracic spine which showed lesions consistent with demyelination though was not given a diagnosis of multiple sclerosis.  In 1998 he had an additional attack of sensory symptoms, with repeat imaging showing interval lesions therefore diagnosis of multiple sclerosis was made.  At that time he was started on Betaseron which he tolerated well.  He said he thinks his most recent relapse was in 2012 which is also sensory symptoms in the lower extremities.  He continued on Betaseron until 2015 though stopped due to injection fatigue.  At that time you switch to Aubagio which she still currently taking.  He has not had any concerning symptoms recently for multiple sclerosis relapse.    He currently denies having any sensory symptoms, weakness, changes in vision, imbalance, fatigue, mood changes, bowel or bladder issues.  Does wonder if he has some short-term memory  "issues as well as word finding difficulties, though these are not limiting his personal or professional task.  Reports that he could walk as far as he wanted without being limited by MS symptoms.    He is currently taking vitamin D, unsure if it is 1000 or 2000 international units daily.    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    Disease onset: 1996  Most recent relapse: 2012  Previous disease modifying therapy:   Betaseron 1998 - 2015, injection fatigue  Aubagio 2015 - current    Symptom management:  Fatigue: none  Mood: stable  Bowel/bladder changes: none  Gait/balance: No issues    The patient's medical, surgical, social, and family history were personally reviewed with the patient.  No past medical history on file.   No past surgical history on file.  Social History     Tobacco Use    Smoking status: Former    Smokeless tobacco: Never   Substance Use Topics    Alcohol use: Yes     Comment: One drink per day     No family history on file.  Current Outpatient Medications   Medication    acetaminophen (TYLENOL) 500 MG tablet    Cholecalciferol (VITAMIN D) 1000 UNIT capsule    ibuprofen (ADVIL,MOTRIN) 200 MG tablet    loratadine (CLARITIN) 10 MG tablet    Multiple Vitamin (MULTI-VITAMIN) per tablet    teriflunomide (AUBAGIO) 14 MG tablet     No current facility-administered medications for this visit.     Allergies   Allergen Reactions    Nkda [No Known Drug Allergy]        Physical Examination   Vitals: /80 (BP Location: Right arm, Patient Position: Sitting, Cuff Size: Adult Regular)   Pulse 80   Ht 1.768 m (5' 9.61\")   Wt 66.9 kg (147 lb 8 oz)   SpO2 97%   BMI 21.40 kg/m     General: Patient appears comfortable in no acute distress.   HEENT: NC/AT, no icterus, moist mucous membranes  Chest: non-labored on RA  Extremities: Warm, no edema  Skin: No rash or lesion   Psych: Affect appropriate for situation   Neuro:  Mental status: Awake, alert, " attentive. Language is fluent with intact comprehension.   Cranial nerves: PERRL with no relative afferent pupillary defect, conjugate gaze, EOMI, visual fields intact, face symmetric, shoulder shrug strong, tongue protrusion/uvula midline, no dysarthria.   Motor: Normal muscle bulk and tone. No abnormal movements. 5/5 strength in 4/4 extremities.     R L  Deltoid  5 5  Biceps  5 5  Triceps  5 5  Wrist ext 5 5  Finger ext 5 5  Finger abd 5 5    Hip flexion 5 5  Knee flexion 5 5  Knee ext 5 5  Dorsiflexion 5 5    Reflexes: Brisk reflexes symmetric in biceps, brachioradialis, patellae, and achilles. No clonus, toes down-going.  Sensory: Intact to light touch, pin, vibration, and proprioception. Romberg is negative.   Coordination: FNF without ataxia or dysmetria.    Gait: Normal width, stride length, turn, with symmetric arm swing. Tandem walk intact.    Laboratory:  LFTs in 2022 wnl  WBC 2.9 (low)    Imaging:  MRI brain  9/2021- multiple non enhancing plaques    Assessment/Plan:  Jamal Perez is a 47 year old male who presents to [facility] care for multiple sclerosis.  Patient was formally diagnosed in 1998.  More recently has been stable on Aubagio for about 8 years.  Has not had a relapse since 2012.  Today he currently declines having any symptoms related to MS.  Does report some mild short-term memory and word finding difficulties, though difficult to discern if this is from MS or normal aging.  We discussed that we can monitor this for now.  If things were to change in the future could consider formal neuropsychological testing.    - CBC, AST, ALT, vitamin D today  - Continue current dose of vitamin D, will adjust dose pending level  - MRI brain with and without contrast in 1 year  - Follow up in 1 year after MRI brain    Patient seen and discussed with Dr. Rothman.   I have reviewed the plan with the patient, who is in agreement.      Maine Pickering MD  Neurology Resident PGY4     I saw and examined this  patient, and have read and edited the resident's note.  I agree with the findings, assessment, and plan.  I spent 47 minutes on his care on the date of service including chart review and face-to-face time.  Jamal has clinically definite relapsing MS that is well controlled on teriflunomide.  We discussed the natural history of MS including how long he will likely need to be on treatment, as well as safety monitoring with teriflunomide.  Plan as above.        Again, thank you for allowing me to participate in the care of your patient.      Sincerely,    Fuentes Rothman MD

## 2023-09-12 NOTE — NURSING NOTE
Chief Complaint   Patient presents with    MS    New Patient     Establishing care       Vitals were taken and medications were reconciled.   Renny Roach, EMT  12:53 PM

## 2023-11-25 ENCOUNTER — HEALTH MAINTENANCE LETTER (OUTPATIENT)
Age: 48
End: 2023-11-25

## 2023-12-18 DIAGNOSIS — G35 MULTIPLE SCLEROSIS (H): ICD-10-CM

## 2023-12-18 RX ORDER — TERIFLUNOMIDE 14 MG/1
14 TABLET, FILM COATED ORAL DAILY
Qty: 30 TABLET | Refills: 11 | Status: SHIPPED | OUTPATIENT
Start: 2023-12-18 | End: 2024-01-02

## 2023-12-18 NOTE — TELEPHONE ENCOUNTER
Received refill request for teriflunomide from [facility]; Patient was last seen in Sep 2023 and has follow up appointment in Sep 2024 with Dr Rothman.   Routed to Dr Rothman for signature.    Yari Conner RN

## 2023-12-18 NOTE — TELEPHONE ENCOUNTER
[facility] Call Center    Phone Message    May a detailed message be left on voicemail: yes     Reason for Call: Medication Refill Request    Has the patient contacted the pharmacy for the refill? Yes   Name of medication being requested:   teriflunomide (AUBAGIO) 14 MG tablet [[id number]] (Order [id number])     Provider who prescribed the medication: Fuentes Rothman MD   Pharmacy: [address] (Pharmacy)   Date medication is needed: 12/18/23       Action Taken: Message routed to:  [facility] ([facility] Neurology    Travel Screening: Not Applicable

## 2024-04-11 ENCOUNTER — TELEPHONE (OUTPATIENT)
Dept: NEUROLOGY | Facility: CLINIC | Age: 49
End: 2024-04-11
Payer: COMMERCIAL

## 2024-04-11 NOTE — TELEPHONE ENCOUNTER
Left Voicemail (1st Attempt) and Sent Mychart (1st Attempt) for the patient to call back and schedule the following:    Appointment type: New Patient  Provider: Dr. Dunn in [place]  Return date: next avail  Specialty phone number: [phone]  Additional appointment(s) needed:   Additonal Notes:     ** patient requested to switch care from Dr. Rothman to Dr. Dunn in [place], Please assist. **

## 2024-08-26 ENCOUNTER — DOCUMENTATION ONLY (OUTPATIENT)
Dept: NEUROLOGY | Facility: CLINIC | Age: 49
End: 2024-08-26
Payer: COMMERCIAL

## 2024-08-26 NOTE — PROGRESS NOTES
Authorization for MRI/CAT Scan has been received from [facility], approval valid from 08/16/2024 through 09/14/2024.  Renny Roach EMT August 26, 2024

## 2024-09-06 ENCOUNTER — ANCILLARY PROCEDURE (OUTPATIENT)
Dept: MRI IMAGING | Facility: CLINIC | Age: 49
End: 2024-09-06
Attending: PSYCHIATRY & NEUROLOGY
Payer: COMMERCIAL

## 2024-09-06 DIAGNOSIS — G35 MULTIPLE SCLEROSIS (H): ICD-10-CM

## 2024-09-06 PROCEDURE — A9585 GADOBUTROL INJECTION: HCPCS | Performed by: RADIOLOGY

## 2024-09-06 PROCEDURE — 70553 MRI BRAIN STEM W/O & W/DYE: CPT | Performed by: RADIOLOGY

## 2024-09-06 RX ORDER — GADOBUTROL 604.72 MG/ML
6.5 INJECTION INTRAVENOUS ONCE
Status: COMPLETED | OUTPATIENT
Start: 2024-09-06 | End: 2024-09-06

## 2024-09-06 RX ADMIN — GADOBUTROL 6.5 ML: 604.72 INJECTION INTRAVENOUS at 07:44

## 2024-09-17 NOTE — PROGRESS NOTES
[facility]  Section of General Neurology  New Patient Visit      Jamal Perez MRN# [id number]   Age: 48 year old YOB: 1975              Assessment and Plan:   Jamal Perez is a pleasant 48 year old male who presents today for evaluation of multiple sclerosis management.  We reviewed his history.  He previously followed at [facility] and with our MS clinic.  He appears to have very stable disease at this time but clearly does have MS to review including with C spine disease.  We discussed options (injections, oral options, infusions e.g.)  and risk/benefit.  I think that his current medication regimen is appropriate to continue.  He seems to be doing well and had injection fatigue from previous regimen as outlined below.  We discussed different schools of thought of timing of repeating imaging.  Most recent MRI brain looks stable.  He would prefer q3 year imaging given his stability.  We compromised on q2 years for now with MRI brain/C spine but sooner with anything that would resemble an MS flare.  He is agreeable.  We will update pertinent blood work.  Plan is for follow up in 1 year for now, sooner with any issues changes or questions.        Mo Dunn MD   of Neurology   [facility]      History of Presenting Symptoms:   Jamal Perez is a 48 year old male who presents today for evaluation of MS management    Aubagio:  Tolerating well     Still feels relatively symptom free- 2012 symptoms in legs (tingling)   No painful visual loss/ optic neuritis  Does get migraines at times.  1 per year.  Does get visual aura.    Imitrex PRN   Lived in [place] previously   Symptom management:  Fatigue: none  Mood: stable  Bowel/bladder changes: none  Gait/balance: No issues    He lives in [place]  Works at med device company --[employer]     Initial HPI/MS history  History of Present Symptom:  Jamal Perez is a 47 year old male  with a PMH significant for multiple sclerosis who presents today to establish care for multiple sclerosis.     Patient previously received care here at the [facility] before moving out of [facility] briefly, and then when he returned back to [place] he received MS care at [facility]. Most recent visit at [facility] was about 1 year ago.       Patient reports that he was diagnosed with multiple sclerosis in 1998 though he believes his symptoms started in 1996.  In 1996 he had an episode of left sided sensory symptoms.  During that time he had MRI brain cervical spine and thoracic spine which showed lesions consistent with demyelination though was not given a diagnosis of multiple sclerosis.  In 1998 he had an additional attack of sensory symptoms, with repeat imaging showing interval lesions therefore diagnosis of multiple sclerosis was made.  At that time he was started on Betaseron which he tolerated well.  He said he thinks his most recent relapse was in 2012 which is also sensory symptoms in the lower extremities.  He continued on Betaseron until 2015 though stopped due to injection fatigue.  At that time you switch to Aubagio which she still currently taking.  He has not had any concerning symptoms recently for multiple sclerosis relapse.     He currently denies having any sensory symptoms, weakness, changes in vision, imbalance, fatigue, mood changes, bowel or bladder issues.  Does wonder if he has some short-term memory issues as well as word finding difficulties, though these are not limiting his personal or professional task.  Reports that he could walk as far as he wanted without being limited by MS symptoms.     He is currently taking vitamin D, unsure if it is 1000 or 2000 international units daily.     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     Disease onset: 1996  Most recent relapse: 2012  Previous disease modifying therapy:   Betaseron 1998 -  2015, injection fatigue  Aubagio 2015 - current     Symptom management:  Fatigue: none  Mood: stable  Bowel/bladder changes: none  Gait/balance: No issues    A/P at last visit (Dr. Rothman)     Assessment/Plan:  Jamal Perez is a 47 year old male who presents to [facility] care for multiple sclerosis.  Patient was formally diagnosed in 1998.  More recently has been stable on Aubagio for about 8 years.  Has not had a relapse since 2012.  Today he currently declines having any symptoms related to MS.  Does report some mild short-term memory and word finding difficulties, though difficult to discern if this is from MS or normal aging.  We discussed that we can monitor this for now.  If things were to change in the future could consider formal neuropsychological testing.     - CBC, AST, ALT, vitamin D today  - Continue current dose of vitamin D, will adjust dose pending level  - MRI brain with and without contrast in 1 year  - Follow up in 1 year after MRI brain     Patient seen and discussed with Dr. Rothman.   I have reviewed the plan with the patient, who is in agreement.        Maine Pickering MD  Neurology Resident PGY4      I saw and examined this patient, and have read and edited the resident's note.  I agree with the findings, assessment, and plan.  I spent 47 minutes on his care on the date of service including chart review and face-to-face time.  Jamal has clinically definite relapsing MS that is well controlled on teriflunomide.  We discussed the natural history of MS including how long he will likely need to be on treatment, as well as safety monitoring with teriflunomide.  Plan as above.     Fuentes Rothman MD       Past Medical History:     Patient Active Problem List   Diagnosis    Multiple sclerosis (H)     No past medical history on file.     Past Surgical History:   No past surgical history on file.     Social History:     Social History     Tobacco Use    Smoking status: Former    Smokeless  tobacco: Never   Substance Use Topics    Alcohol use: Yes     Comment: One drink per day        Family History:   No family history on file.     Medications:     Current Outpatient Medications   Medication Sig Dispense Refill    acetaminophen (TYLENOL) 500 MG tablet Take 1 tablet by mouth. every 4-6 hours as needed      Cholecalciferol (VITAMIN D) 1000 UNIT capsule Take 1 capsule by mouth daily.      ibuprofen (ADVIL,MOTRIN) 200 MG tablet Take 1 tablet by mouth. Every 6-8 hours as needed      loratadine (CLARITIN) 10 MG tablet Take 1 tablet by mouth. Every morning as needed      Multiple Vitamin (MULTI-VITAMIN) per tablet Take 1 tablet by mouth daily.      teriflunomide (AUBAGIO) 14 MG tablet Take 1 tablet (14 mg) by mouth daily 30 tablet 11     No current facility-administered medications for this visit.        Allergies:     Allergies   Allergen Reactions    Nkda [No Known Drug Allergy]         Review of Systems:   As noted above     Physical Exam:   Vitals: /87   Pulse 71   Wt 69.4 kg (153 lb)   SpO2 98%   BMI 22.20 kg/m         Neuro:   General Appearance: No apparent distress, well-nourished, well-groomed, pleasant     Mental Status: Alert and oriented to person, place, and time. Speech fluent and comprehension intact. No dysarthria.     Cranial Nerves:   II: Visual fields: normal  III: Pupils: 3 mm, equal, round, reactive to light   III,IV,VI: Extraocular Movements: intact   V: Facial sensation: intact to light touch  VII: Facial strength: intact without asymmetry  VIII: Hearing: intact grossly  IX: Palate: intact        Motor Exam:   5/5 Diffusely    No drift is present. No abnormal movements. Tone is normal throughout.    Sensory: intact to light touch, vibration    Coordination: no dysmetria with finger-to-nose bilaterally    Reflexes: biceps, triceps, brachioradialis, patellar, and ankle jerks 2+ and symmetric. Toes are downgoing bilaterally             Data: Pertinent prior to visit    Imaging:  Narrative & Impression   Brain MR without and with contrast      History: Multiple sclerosis (H).   ICD-10: Multiple sclerosis (H)  Comparison: MRI from 9/23/2021.     Technique:   Brain MR: Multiplanar FLAIR and axial T1-weighted images were obtained  without intravenous contrast. Following intravenous gadolinium-based  contrast administration, axial diffusion, axial susceptibility, axial  T2, axial T1, and coronal T1-weighted images were obtained.     Contrast: 6.5 ml gadavist     Findings: There are 10-15 foci of T2-hyperintensity within the  cerebral white matter that raise the question of underlying  demyelinating disease. Specifically, there are lesions within the  subcortical and periventricular white matter, brainstem, and right  cerebellum The T1-weighted images do not show any areas of severe  hypointensity. There is no significant cerebral atrophy.     No mass lesion, midline shift, or abnormal fluid collection. Increased  T2 signal left optic nerve, likely sequela of optic neuritis.     Following the administration of intravenous contrast, there are no  foci of abnormal contrast enhancement noted. Compared to the previous  study, no significant interval change in the limitation of differences  in technique (1 mm versus 3mm FLAIR on prior).     The major intracranial vascular flow-voids do appear patent. Mucosal  thickening of the ethmoid air cells and left mastoid effusion.   Tortuous course of the bilateral optic nerves.                                                                      Impression:   1. The study demonstrates 10-15 foci of T2-hyperintensity within the  supra- and infra tentorial areas, consistent with the clinical  diagnosis of demyelinating disease. There are no foci of abnormal  enhancement noted intracranially.   2. No significant interval change from the prior study in the  limitation of differences in technique.      I personally reviewed the above images and reports.   The imaging represents to me MS disease without obvious new lesions    MRI of the Cervical Spine without and with contrast 2013     History: Multiple sclerosis.     Comparison: Brain MRI on the same date. Cervical spine MRI 7/25/2012.     Technique: Sagittal T1-weighted, T2-weighted, sagittal STIR, axial  T2-weighted spin echo and gradient echo images, and sagittal  diffusion-weighted images were obtained of the cervical spine without  intravenous contrast. Following the administration of intravenous  contrast, fat saturated axial, sagittal, and coronal T1-weighted  images of the cervical spine were obtained.     Contrast: 7.5 cc Gadavist IV.     Findings:  The cervical vertebrae appear normally aligned. T2 hyperintense  lesion in the dorsal midline cervical cord at the level of C4,  unchanged. Subtle T2 hyperintense lesion in the parasagittal right  dorsal cord at the level of C5-6, unchanged. Small lesion in the  right dorsolateral cord at C6-7, unchanged. No restricted diffusion  in the cervical cord. Postcontrast images demonstrate no abnormal  enhancement in the cervical cord, cervical spinal canal, or of the  cervical vertebrae. Regarding bone marrow signal intensity, no  abnormality is visualized on STIR images.     The findings on a level by level basis are as follows:     C2-3:  No spinal canal or neural foraminal stenosis.     C3-4:  Mild posterior disc bulge and bilateral uncinate hypertrophy  without spinal canal or neural foraminal stenosis.     C4-5:  No spinal canal or neural foraminal stenosis.     C5-6:  Minimal loss of disc height with a circumferential disc bulge  and bilateral uncinate hypertrophy. No spinal canal or neural  foraminal stenosis.     C6-7:  No spinal canal or neural foraminal stenosis.     C7-T1: No spinal canal or neural foraminal stenosis.     No definite abnormality is noted of the visualized paraspinous  tissues.      Impression   Impression:  1. T2 hyperintense lesions in the  "cervical cord at the level of C4,  C5-6, and C6-7, unchanged. No associated enhancement to suggest  active demyelination.  2. No spinal canal or neural foraminal stenosis.         Laboratory:       Lab Results   Component Value Date    WBC 4.5 09/12/2023    WBC 6.4 11/06/2017     Lab Results   Component Value Date    RBC 5.10 09/12/2023    RBC 5.37 11/06/2017     Lab Results   Component Value Date    HGB 15.1 09/12/2023    HGB 15.3 11/06/2017     Lab Results   Component Value Date    HCT 43.4 09/12/2023    HCT 46.6 11/06/2017     No components found for: \"MCT\"  Lab Results   Component Value Date    MCV 85 09/12/2023    MCV 87 11/06/2017     Lab Results   Component Value Date    MCH 29.6 09/12/2023    MCH 28.5 11/06/2017     Lab Results   Component Value Date    MCHC 34.8 09/12/2023    MCHC 32.8 11/06/2017     Lab Results   Component Value Date    RDW 13.1 09/12/2023    RDW 13.3 11/06/2017     Lab Results   Component Value Date     09/12/2023     11/06/2017     Last Comprehensive Metabolic Panel:  Bilirubin Total   Date Value Ref Range Status   11/06/2017 0.9 0.2 - 1.3 mg/dL Final     Alkaline Phosphatase   Date Value Ref Range Status   11/06/2017 68 40 - 150 U/L Final     ALT   Date Value Ref Range Status   09/12/2023 19 0 - 70 U/L Final     Comment:     Reference intervals for this test were updated on 6/12/2023 to more accurately reflect our healthy population. There may be differences in the flagging of prior results with similar values performed with this method. Interpretation of those prior results can be made in the context of the updated reference intervals.     11/06/2017 32 0 - 70 U/L Final     AST   Date Value Ref Range Status   09/12/2023 25 0 - 45 U/L Final     Comment:     Reference intervals for this test were updated on 6/12/2023 to more accurately reflect our healthy population. There may be differences in the flagging of prior results with similar values performed with this method. " Interpretation of those prior results can be made in the context of the updated reference intervals.   11/06/2017 27 0 - 45 U/L Final                       The total time of this encounter today amounted to 60 minutes. This time included time spent with the patient, prep work, ordering tests, and performing post visit documentation.    The longitudinal plan of care for MS management was addressed during this visit. Due to the added complexity in care, I will continue to support Mr Perez in the subsequent management of this condition(s) and with the ongoing continuity of care of this condition(s).

## 2024-09-27 ENCOUNTER — OFFICE VISIT (OUTPATIENT)
Dept: NEUROLOGY | Facility: CLINIC | Age: 49
End: 2024-09-27
Payer: COMMERCIAL

## 2024-09-27 ENCOUNTER — LAB (OUTPATIENT)
Dept: LAB | Facility: CLINIC | Age: 49
End: 2024-09-27
Payer: COMMERCIAL

## 2024-09-27 VITALS
OXYGEN SATURATION: 98 % | WEIGHT: 153 LBS | DIASTOLIC BLOOD PRESSURE: 87 MMHG | BODY MASS INDEX: 22.2 KG/M2 | HEART RATE: 71 BPM | SYSTOLIC BLOOD PRESSURE: 138 MMHG

## 2024-09-27 DIAGNOSIS — E55.9 VITAMIN D DEFICIENCY: ICD-10-CM

## 2024-09-27 DIAGNOSIS — G35 MULTIPLE SCLEROSIS (H): ICD-10-CM

## 2024-09-27 DIAGNOSIS — G35 MULTIPLE SCLEROSIS (H): Primary | ICD-10-CM

## 2024-09-27 DIAGNOSIS — Z79.899 MEDICATION MANAGEMENT: ICD-10-CM

## 2024-09-27 LAB
ALBUMIN SERPL BCG-MCNC: 4.7 G/DL (ref 3.5–5.2)
ALP SERPL-CCNC: 62 U/L (ref 40–150)
ALT SERPL W P-5'-P-CCNC: 20 U/L (ref 0–70)
ANION GAP SERPL CALCULATED.3IONS-SCNC: 9 MMOL/L (ref 7–15)
AST SERPL W P-5'-P-CCNC: 27 U/L (ref 0–45)
BILIRUB SERPL-MCNC: 0.6 MG/DL
BUN SERPL-MCNC: 17.1 MG/DL (ref 6–20)
CALCIUM SERPL-MCNC: 9.9 MG/DL (ref 8.8–10.4)
CHLORIDE SERPL-SCNC: 105 MMOL/L (ref 98–107)
CREAT SERPL-MCNC: 1.03 MG/DL (ref 0.67–1.17)
EGFRCR SERPLBLD CKD-EPI 2021: 90 ML/MIN/1.73M2
ERYTHROCYTE [DISTWIDTH] IN BLOOD BY AUTOMATED COUNT: 13 % (ref 10–15)
GLUCOSE SERPL-MCNC: 95 MG/DL (ref 70–99)
HCO3 SERPL-SCNC: 25 MMOL/L (ref 22–29)
HCT VFR BLD AUTO: 47.9 % (ref 40–53)
HGB BLD-MCNC: 15.7 G/DL (ref 13.3–17.7)
MCH RBC QN AUTO: 28.2 PG (ref 26.5–33)
MCHC RBC AUTO-ENTMCNC: 32.8 G/DL (ref 31.5–36.5)
MCV RBC AUTO: 86 FL (ref 78–100)
PLATELET # BLD AUTO: 174 10E3/UL (ref 150–450)
POTASSIUM SERPL-SCNC: 4.8 MMOL/L (ref 3.4–5.3)
PROT SERPL-MCNC: 7.2 G/DL (ref 6.4–8.3)
RBC # BLD AUTO: 5.56 10E6/UL (ref 4.4–5.9)
SODIUM SERPL-SCNC: 139 MMOL/L (ref 135–145)
VIT D+METAB SERPL-MCNC: 78 NG/ML (ref 20–50)
WBC # BLD AUTO: 3.7 10E3/UL (ref 4–11)

## 2024-09-27 PROCEDURE — G2211 COMPLEX E/M VISIT ADD ON: HCPCS | Performed by: STUDENT IN AN ORGANIZED HEALTH CARE EDUCATION/TRAINING PROGRAM

## 2024-09-27 PROCEDURE — 99417 PROLNG OP E/M EACH 15 MIN: CPT | Performed by: STUDENT IN AN ORGANIZED HEALTH CARE EDUCATION/TRAINING PROGRAM

## 2024-09-27 PROCEDURE — 80053 COMPREHEN METABOLIC PANEL: CPT

## 2024-09-27 PROCEDURE — 82306 VITAMIN D 25 HYDROXY: CPT

## 2024-09-27 PROCEDURE — 85027 COMPLETE CBC AUTOMATED: CPT

## 2024-09-27 PROCEDURE — 36415 COLL VENOUS BLD VENIPUNCTURE: CPT

## 2024-09-27 PROCEDURE — 99215 OFFICE O/P EST HI 40 MIN: CPT | Performed by: STUDENT IN AN ORGANIZED HEALTH CARE EDUCATION/TRAINING PROGRAM

## 2024-09-27 RX ORDER — PROPRANOLOL HYDROCHLORIDE 20 MG/1
TABLET ORAL
COMMUNITY
Start: 2023-10-31

## 2024-09-27 NOTE — NURSING NOTE
"Jamal Perez is a 48 year old male who presents for:  Chief Complaint   Patient presents with    Referral     Multiple sclerosis, MRI review - referred by Fuentes Rothman MD        Initial Vitals:  /87   Pulse 71   Wt 69.4 kg (153 lb)   SpO2 98%   BMI 22.20 kg/m   Estimated body mass index is 22.2 kg/m  as calculated from the following:    Height as of 9/12/23: 1.768 m (5' 9.61\").    Weight as of this encounter: 69.4 kg (153 lb).. Body surface area is 1.85 meters squared. BP completed using cuff size: regular    Oralia Montez    "

## 2024-09-27 NOTE — LETTER
9/27/2024      Jamal Perez  [address]  [facility] [address]      Dear Colleague,    Thank you for referring your patient, Jamal Perez, to the [facility] NEUROLOGY CLINICS [facility]. Please see a copy of my visit note below.    [facility]/[place]  Section of General Neurology  New Patient Visit      Jamal Perez MRN# [id number]   Age: 48 year old YOB: 1975              Assessment and Plan:   Jamal Perez is a pleasant 48 year old male who presents today for evaluation of multiple sclerosis management.  We reviewed his history.  He previously followed at [facility] and with our MS clinic.  He appears to have very stable disease at this time but clearly does have MS to review including with C spine disease.  We discussed options (injections, oral options, infusions e.g.)  and risk/benefit.  I think that his current medication regimen is appropriate to continue.  He seems to be doing well and had injection fatigue from previous regimen as outlined below.  We discussed different schools of thought of timing of repeating imaging.  Most recent MRI brain looks stable.  He would prefer q3 year imaging given his stability.  We compromised on q2 years for now with MRI brain/C spine but sooner with anything that would resemble an MS flare.  He is agreeable.  We will update pertinent blood work.  Plan is for follow up in 1 year for now, sooner with any issues changes or questions.        Mo Dunn MD   of Neurology   [facility]      History of Presenting Symptoms:   Jamal Perez is a 48 year old male who presents today for evaluation of MS management    Aubagio:  Tolerating well     Still feels relatively symptom free- 2012 symptoms in legs (tingling)   No painful visual loss/ optic neuritis  Does get migraines at times.  1 per year.  Does get visual aura.    Imitrex PRN   Lived in [place] previously   Symptom  management:  Fatigue: none  Mood: stable  Bowel/bladder changes: none  Gait/balance: No issues    He lives in [place]  Works at med device company --[employer]     Initial HPI/MS history  History of Present Symptom:  Jamal Perez is a 47 year old male with a PMH significant for multiple sclerosis who presents today to establish care for multiple sclerosis.     Patient previously received care here at the [facility] before moving out of [facility] briefly, and then when he returned back to [place] he received MS care at [facility]. Most recent visit at [facility] was about 1 year ago.       Patient reports that he was diagnosed with multiple sclerosis in 1998 though he believes his symptoms started in 1996.  In 1996 he had an episode of left sided sensory symptoms.  During that time he had MRI brain cervical spine and thoracic spine which showed lesions consistent with demyelination though was not given a diagnosis of multiple sclerosis.  In 1998 he had an additional attack of sensory symptoms, with repeat imaging showing interval lesions therefore diagnosis of multiple sclerosis was made.  At that time he was started on Betaseron which he tolerated well.  He said he thinks his most recent relapse was in 2012 which is also sensory symptoms in the lower extremities.  He continued on Betaseron until 2015 though stopped due to injection fatigue.  At that time you switch to Aubagio which she still currently taking.  He has not had any concerning symptoms recently for multiple sclerosis relapse.     He currently denies having any sensory symptoms, weakness, changes in vision, imbalance, fatigue, mood changes, bowel or bladder issues.  Does wonder if he has some short-term memory issues as well as word finding difficulties, though these are not limiting his personal or professional task.  Reports that he could walk as far as he wanted without being limited by MS symptoms.     He is currently taking vitamin D, unsure  if it is 1000 or 2000 international units daily.     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     Disease onset: 1996  Most recent relapse: 2012  Previous disease modifying therapy:   Betaseron 1998 - 2015, injection fatigue  Aubagio 2015 - current     Symptom management:  Fatigue: none  Mood: stable  Bowel/bladder changes: none  Gait/balance: No issues    A/P at last visit (Dr. Rothman)     Assessment/Plan:  Jamal Perez is a 47 year old male who presents to [facility] care for multiple sclerosis.  Patient was formally diagnosed in 1998.  More recently has been stable on Aubagio for about 8 years.  Has not had a relapse since 2012.  Today he currently declines having any symptoms related to MS.  Does report some mild short-term memory and word finding difficulties, though difficult to discern if this is from MS or normal aging.  We discussed that we can monitor this for now.  If things were to change in the future could consider formal neuropsychological testing.     - CBC, AST, ALT, vitamin D today  - Continue current dose of vitamin D, will adjust dose pending level  - MRI brain with and without contrast in 1 year  - Follow up in 1 year after MRI brain     Patient seen and discussed with Dr. Rothman.   I have reviewed the plan with the patient, who is in agreement.        Maine Pickering MD  Neurology Resident PGY4      I saw and examined this patient, and have read and edited the resident's note.  I agree with the findings, assessment, and plan.  I spent 47 minutes on his care on the date of service including chart review and face-to-face time.  Jamal has clinically definite relapsing MS that is well controlled on teriflunomide.  We discussed the natural history of MS including how long he will likely need to be on treatment, as well as safety monitoring with teriflunomide.  Plan as above.     Fuentes Rothman MD       Past Medical History:      Patient Active Problem List   Diagnosis     Multiple sclerosis (H)     No past medical history on file.     Past Surgical History:   No past surgical history on file.     Social History:     Social History     Tobacco Use     Smoking status: Former     Smokeless tobacco: Never   Substance Use Topics     Alcohol use: Yes     Comment: One drink per day        Family History:   No family history on file.     Medications:     Current Outpatient Medications   Medication Sig Dispense Refill     acetaminophen (TYLENOL) 500 MG tablet Take 1 tablet by mouth. every 4-6 hours as needed       Cholecalciferol (VITAMIN D) 1000 UNIT capsule Take 1 capsule by mouth daily.       ibuprofen (ADVIL,MOTRIN) 200 MG tablet Take 1 tablet by mouth. Every 6-8 hours as needed       loratadine (CLARITIN) 10 MG tablet Take 1 tablet by mouth. Every morning as needed       Multiple Vitamin (MULTI-VITAMIN) per tablet Take 1 tablet by mouth daily.       teriflunomide (AUBAGIO) 14 MG tablet Take 1 tablet (14 mg) by mouth daily 30 tablet 11     No current facility-administered medications for this visit.        Allergies:     Allergies   Allergen Reactions     Nkda [No Known Drug Allergy]         Review of Systems:   As noted above     Physical Exam:   Vitals: /87   Pulse 71   Wt 69.4 kg (153 lb)   SpO2 98%   BMI 22.20 kg/m         Neuro:   General Appearance: No apparent distress, well-nourished, well-groomed, pleasant     Mental Status: Alert and oriented to person, place, and time. Speech fluent and comprehension intact. No dysarthria.     Cranial Nerves:   II: Visual fields: normal  III: Pupils: 3 mm, equal, round, reactive to light   III,IV,VI: Extraocular Movements: intact   V: Facial sensation: intact to light touch  VII: Facial strength: intact without asymmetry  VIII: Hearing: intact grossly  IX: Palate: intact        Motor Exam:   5/5 Diffusely    No drift is present. No abnormal movements. Tone is normal  throughout.    Sensory: intact to light touch, vibration    Coordination: no dysmetria with finger-to-nose bilaterally    Reflexes: biceps, triceps, brachioradialis, patellar, and ankle jerks 2+ and symmetric. Toes are downgoing bilaterally             Data: Pertinent prior to visit   Imaging:  Narrative & Impression   Brain MR without and with contrast      History: Multiple sclerosis (H).   ICD-10: Multiple sclerosis (H)  Comparison: MRI from 9/23/2021.     Technique:   Brain MR: Multiplanar FLAIR and axial T1-weighted images were obtained  without intravenous contrast. Following intravenous gadolinium-based  contrast administration, axial diffusion, axial susceptibility, axial  T2, axial T1, and coronal T1-weighted images were obtained.     Contrast: 6.5 ml gadavist     Findings: There are 10-15 foci of T2-hyperintensity within the  cerebral white matter that raise the question of underlying  demyelinating disease. Specifically, there are lesions within the  subcortical and periventricular white matter, brainstem, and right  cerebellum The T1-weighted images do not show any areas of severe  hypointensity. There is no significant cerebral atrophy.     No mass lesion, midline shift, or abnormal fluid collection. Increased  T2 signal left optic nerve, likely sequela of optic neuritis.     Following the administration of intravenous contrast, there are no  foci of abnormal contrast enhancement noted. Compared to the previous  study, no significant interval change in the limitation of differences  in technique (1 mm versus 3mm FLAIR on prior).     The major intracranial vascular flow-voids do appear patent. Mucosal  thickening of the ethmoid air cells and left mastoid effusion.   Tortuous course of the bilateral optic nerves.                                                                      Impression:   1. The study demonstrates 10-15 foci of T2-hyperintensity within the  supra- and infra tentorial areas,  consistent with the clinical  diagnosis of demyelinating disease. There are no foci of abnormal  enhancement noted intracranially.   2. No significant interval change from the prior study in the  limitation of differences in technique.      I personally reviewed the above images and reports.  The imaging represents to me MS disease without obvious new lesions    MRI of the Cervical Spine without and with contrast 2013     History: Multiple sclerosis.     Comparison: Brain MRI on the same date. Cervical spine MRI 7/25/2012.     Technique: Sagittal T1-weighted, T2-weighted, sagittal STIR, axial  T2-weighted spin echo and gradient echo images, and sagittal  diffusion-weighted images were obtained of the cervical spine without  intravenous contrast. Following the administration of intravenous  contrast, fat saturated axial, sagittal, and coronal T1-weighted  images of the cervical spine were obtained.     Contrast: 7.5 cc Gadavist IV.     Findings:  The cervical vertebrae appear normally aligned. T2 hyperintense  lesion in the dorsal midline cervical cord at the level of C4,  unchanged. Subtle T2 hyperintense lesion in the parasagittal right  dorsal cord at the level of C5-6, unchanged. Small lesion in the  right dorsolateral cord at C6-7, unchanged. No restricted diffusion  in the cervical cord. Postcontrast images demonstrate no abnormal  enhancement in the cervical cord, cervical spinal canal, or of the  cervical vertebrae. Regarding bone marrow signal intensity, no  abnormality is visualized on STIR images.     The findings on a level by level basis are as follows:     C2-3:  No spinal canal or neural foraminal stenosis.     C3-4:  Mild posterior disc bulge and bilateral uncinate hypertrophy  without spinal canal or neural foraminal stenosis.     C4-5:  No spinal canal or neural foraminal stenosis.     C5-6:  Minimal loss of disc height with a circumferential disc bulge  and bilateral uncinate hypertrophy. No spinal  "canal or neural  foraminal stenosis.     C6-7:  No spinal canal or neural foraminal stenosis.     C7-T1: No spinal canal or neural foraminal stenosis.     No definite abnormality is noted of the visualized paraspinous  tissues.      Impression   Impression:  1. T2 hyperintense lesions in the cervical cord at the level of C4,  C5-6, and C6-7, unchanged. No associated enhancement to suggest  active demyelination.  2. No spinal canal or neural foraminal stenosis.         Laboratory:       Lab Results   Component Value Date    WBC 4.5 09/12/2023    WBC 6.4 11/06/2017     Lab Results   Component Value Date    RBC 5.10 09/12/2023    RBC 5.37 11/06/2017     Lab Results   Component Value Date    HGB 15.1 09/12/2023    HGB 15.3 11/06/2017     Lab Results   Component Value Date    HCT 43.4 09/12/2023    HCT 46.6 11/06/2017     No components found for: \"MCT\"  Lab Results   Component Value Date    MCV 85 09/12/2023    MCV 87 11/06/2017     Lab Results   Component Value Date    MCH 29.6 09/12/2023    MCH 28.5 11/06/2017     Lab Results   Component Value Date    MCHC 34.8 09/12/2023    MCHC 32.8 11/06/2017     Lab Results   Component Value Date    RDW 13.1 09/12/2023    RDW 13.3 11/06/2017     Lab Results   Component Value Date     09/12/2023     11/06/2017     Last Comprehensive Metabolic Panel:  Bilirubin Total   Date Value Ref Range Status   11/06/2017 0.9 0.2 - 1.3 mg/dL Final     Alkaline Phosphatase   Date Value Ref Range Status   11/06/2017 68 40 - 150 U/L Final     ALT   Date Value Ref Range Status   09/12/2023 19 0 - 70 U/L Final     Comment:     Reference intervals for this test were updated on 6/12/2023 to more accurately reflect our healthy population. There may be differences in the flagging of prior results with similar values performed with this method. Interpretation of those prior results can be made in the context of the updated reference intervals.     11/06/2017 32 0 - 70 U/L Final     AST   Date " Value Ref Range Status   09/12/2023 25 0 - 45 U/L Final     Comment:     Reference intervals for this test were updated on 6/12/2023 to more accurately reflect our healthy population. There may be differences in the flagging of prior results with similar values performed with this method. Interpretation of those prior results can be made in the context of the updated reference intervals.   11/06/2017 27 0 - 45 U/L Final                       The total time of this encounter today amounted to 60 minutes. This time included time spent with the patient, prep work, ordering tests, and performing post visit documentation.    The longitudinal plan of care for MS management was addressed during this visit. Due to the added complexity in care, I will continue to support Mr Perez in the subsequent management of this condition(s) and with the ongoing continuity of care of this condition(s).      Again, thank you for allowing me to participate in the care of your patient.        Sincerely,        Rashawn Dunn MD

## 2025-01-04 ENCOUNTER — HEALTH MAINTENANCE LETTER (OUTPATIENT)
Age: 50
End: 2025-01-04